# Patient Record
(demographics unavailable — no encounter records)

---

## 2024-10-14 NOTE — DISCUSSION/SUMMARY
[Stable] : stable [Holter Monitor] : a Holter monitor [GERD] : gastroesophageal reflux disease [Musculoskeletal Chest Pain] : musculoskeletal chest pain [Costochondritis] : costochondritis [Pleurisy] : pleurisy [Pericarditis] : pericarditis [Multidetector Cardiac CT] : multidetector cardiac CT [PVCs] : ectopic ventricular beats [Responding to Treatment] : responding to treatment [None] : There are no changes in medication management [Echocardiogram] : an echocardiogram [Minutes Spent: ___] : for [unfilled] ~Uminutes [de-identified] : didnt take BB today [de-identified] : f/u alexus patch [de-identified] : resume BB, avoid decongestants [de-identified] : cardiac chest cta, pcp or pulm eveal for reactive airway dz [FreeTextEntry2] : reviewed prior tests

## 2024-10-14 NOTE — REASON FOR VISIT
[FreeTextEntry1] : pt had Covid-19 3/2021 and since that feels palps, skipped beats daily episodes, at rest, not exertional, no sob or sscp, pt started having palps, ,pvcs s/p covid, better now on toprol, now for f/u, s/p URI, feels intermittent sob, chest not right, for hours on certain days past 1-2 weeks, daughter had pneumonia